# Patient Record
(demographics unavailable — no encounter records)

---

## 2017-10-06 NOTE — GI REPORT
Procedure Date: 10/6/2017 9:24 AM

Procedure:            Upper GI endoscopy

Indications:          Epigastric abdominal pain, Melena

Medicines:            Propofol per Anesthesia

Complications:        No immediate complications. Estimated blood loss: None.

Estimated Blood Loss: Estimated blood loss: none.

Procedure:            Pre-Anesthesia Assessment:

                      - Prior to the procedure, a History and Physical was 

                      performed, and patient medications, allergies and 

                      sensitivities were reviewed. The patient's tolerance of 

                      previous anesthesia was reviewed.

                      - The risks and benefits of the procedure and the 

                      sedation options and risks were discussed with the 

                      patient. All questions were answered and informed 

                      consent was obtained.

                      - Patient identification and proposed procedure were 

                      verified prior to the procedure by the physician and 

                      the nurse. The procedure was verified in the 

                      pre-procedure area in the procedure room.

                      - Mental Status Examination: alert and oriented. Airway 

                      Examination: normal oropharyngeal airway and neck 

                      mobility. Respiratory Examination: clear to 

                      auscultation. CV Examination: normal. Abdominal 

                      Examination: bowel sounds present, abdomen soft and 

                      non-tender, no masses or organomegaly noted.

                      - ASA Grade Assessment: III - A patient with severe 

                      systemic disease.

                      After obtaining informed consent, the endoscope was 

                      passed under direct vision. Throughout the procedure, 

                      the patient's blood pressure, pulse, and oxygen 

                      saturations were monitored continuously. The Scope was 

                      introduced through the mouth, and advanced to the 

                      second part of duodenum. The upper GI endoscopy was 

                      accomplished without difficulty. The patient tolerated 

                      the procedure well.

Findings:

     The esophagus was normal.

     Mild inflammation characterized by congestion (edema) and erythema was 

     found in the gastric antrum.

     The examined duodenum was normal.

Impression:           - Normal esophagus.

                      - Gastritis. Small amount of retained food. No ulcers.

                      - Normal examined duodenum.

                      - No specimens collected.

Recommendation:       - Follow an antireflux regimen.

                      - Use a proton pump inhibitor PO daily. '

                      - Consider gastric emptying scan to evaluate for 

                      gastroparesis given the retained food.

                      - Return to primary care physician as previously 

                      scheduled.

                      - Discharge patient to home.

Nisha Haney D.O.

Nisha Haney DO

10/6/2017 9:36:46 AM

This report has been signed electronically.

Note Initiated On: 10/6/2017 9:24 AM

     I attest to the content of the Intraoperative Record and orders 

     documented therein, exceptions below

## 2017-10-06 NOTE — ANESTHESIOLOGY PROGRESS NOTE
Anesthesia Post Op Note


Date & Time


Oct 6, 2017 at 10:44





Vital Signs


Pain Intensity:  0





Vital Signs Past 12 Hours








  Date Time  Temp Pulse Resp B/P (MAP) Pulse Ox O2 Delivery O2 Flow Rate FiO2


 


10/6/17 10:19  63 12 155/63 (93) 100 Room Air  


 


10/6/17 09:51  83 18 145/84 (104) 100 Room Air  


 


10/6/17 09:35  83 12 113/63 (80) 100 Room Air  


 


10/6/17 08:48 37.1 72 20 140/86 (104) 99 Room Air  











Notes


Mental Status:  alert / awake / arousable, participated in evaluation


Pt Amnestic to Procedure:  Yes


Nausea / Vomiting:  adequately controlled


Pain:  adequately controlled


Airway Patency, RR, SpO2:  stable & adequate


BP & HR:  stable & adequate


Hydration State:  stable & adequate


Anesthetic Complications:  no major complications apparent

## 2017-10-06 NOTE — DISCHARGE INSTRUCTIONS
Endoscopy Patient Instructions


Date / Procedure(s) Performed


Oct 6, 2017.


EGD





Allergy Information


Coded Allergies:  


     Tetanus Toxoid (Verified  Allergy, Unknown, hives, 9/28/17)





Discharge Date / Findings


Oct 6, 2017.


gastritis





Medication Instructions


Restart Stopped Medication(s):


OK to resume home medications





Provider Instructions





Activity Restrictions





-  No exercising or heavy lifting for 24 hours. 


-  Do not drink alcohol the day of the procedure.


-  Do not drive a car or operate machinery until the day after the procedure.


-  Do not make any important decisions or sign important papers in 24 hours 

after the procedure.





Following Day:





-  Return to full activity which may include returning to work/school.





Diet





STOP DRINKING SODA!!!!!





Start your diet with liquids and light foods (jello, soup, juice, toast).  Then 

eat your usual diet if not nauseated.





Treatment For Common After Affects





For mild abdominal pain, bloating, or excessive gas:





-  Rest


-  Eat lightly


-  Lie on right side


Take OTC omeprazole 20 mg once a day





Follow-Up Information


Follow-up with Dr. Slaughter as scheduled





Anesthesia Information





What You Should Know





You have had a procedure that required some medicine to reduce anxiety and 

discomfort. This treatment is called moderate sedation.  


After receiving the treatment, you may be sleepy, but you will be able to 

breathe on your own.  The effects of the treatment may last for several hours.








Follow these instructions along with Activity/Diet recommendations noted above:





*  Do NOT do anything where dizziness or clumsiness would be dangerous.





*  Rest quietly at home today, then you can be up and about tomorrow.





*  Have a responsible person stay with you the rest of today.





*  You may have had an I.V. today.  If so, you may take the dressing off later 

today.





Recommendations


 


Call your doctor if:





*  Trouble breathing 





*  Continuous vomiting for more than 24 hours








*  Temperature above 101 degrees





*  Severe abdominal pain or bloating





*  Pain not relieved by pain medicine ordered





*  There is increased drainage or redness from any incision





*  A large amount of rectal bleeding greater than 2-3 tablespoons. 


   (If you had a polyp/s removed or have hemorrhoids, a small amount of blood -


    from the rectum is to be expected.)





*  You have any unanswered questions or concerns.








IN THE EVENT OF A SERIOUS EMERGENCY, GO TO THE NEAREST EMERGENCY ROOM








       Your discharge instructions were prepared by provider Nisha Haney.





 Patient Instructions Signature Page














James Mosley 











Patient (or Guardian) Signature/Date:____________________________________ I 

have read and understand the instructions given to me by my caregivers.








Caregiver/RN/Doctor Signature/Date:____________________________________











The above-named patient and/or guardian has received patient instructions on 

this date.





























+  Original Patient Signature Page (only) stays with chart.  Please make copy 

for patient.